# Patient Record
Sex: MALE | Race: WHITE | NOT HISPANIC OR LATINO | Employment: STUDENT | URBAN - NONMETROPOLITAN AREA
[De-identification: names, ages, dates, MRNs, and addresses within clinical notes are randomized per-mention and may not be internally consistent; named-entity substitution may affect disease eponyms.]

---
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
## 2023-06-05 ENCOUNTER — TRANSCRIBE ORDERS (OUTPATIENT)
Dept: DIABETES SERVICES | Facility: HOSPITAL | Age: 23
End: 2023-06-05
Payer: MEDICARE

## 2023-06-05 DIAGNOSIS — R73.03 PREDIABETES: Primary | ICD-10-CM

## 2023-06-05 DIAGNOSIS — E66.9 OBESITY, UNSPECIFIED CLASSIFICATION, UNSPECIFIED OBESITY TYPE, UNSPECIFIED WHETHER SERIOUS COMORBIDITY PRESENT: ICD-10-CM

## 2023-08-15 NOTE — PROGRESS NOTES
Chief Complaint  Diabetes (New patient, no devices )    Referred By: Jaguar Olvera MD    Subjective          Jeff Carlton presents to [facility] for diabetes medication management    History of Present Illness    Visit type:  to establish care  Diabetes type:  Prediabetes  Age at time of dx/Year of dx/Number of years: Patient had labs in May which showed an A1c of 6.2% indicating prediabetes at this point.  Family History of Diabetes: Paternal grandmother  Current diabetes status/concerns/issues: Patient was referred to our clinic by his primary care provider for management of his prediabetes.  Other current health concerns: mild mental disability  Current Diabetes symptoms:    Polyuria: No   Polydipsia: No   Polyphagia: No   Blurred vision: No   Excessive fatigue: Yes    Known Diabetes complications:  Neuropathy: None; Location: N/A  Renal: None  Eyes: None; Location: N/A; Last Eye Exam:  Earlier this year ; Location: Dr. Reyna  Amputation/Wounds: None  GI: None  Cardiovascular: Hyperlipidemia  ED: N/A  Other: None  Hospitalizations/ED/911 secondary to DM?  No  Hypoglycemia:  None reported at this time  Hypoglycemia Symptoms:  No hypoglycemia at this time  Current Diabetes treatment: None  Prior diabetes treatments: None  Using ACEI or ARB: No  Using Statin: No  Blood glucose device:  Meter  Blood glucose monitoring frequency:  Random/occasional  Blood glucose range/average:   Around 110  Glucose Source: Patient Reported  Dietary behavior:  Avoids sugary drinks, Number of meals each day - 2-3; Number of snacks each day - 1, admits to eating 2 juni cups a day  Activity/Exercise:  None  Last Foot Exam: None  Diabetes Education Hx: None  Social Determinants of Health: None    History reviewed. No pertinent past medical history.  History reviewed. No pertinent surgical history.  Family History   Problem Relation Age of Onset    Cancer Mother     Cancer Father     Diabetes  Maternal Grandmother     Hypertension Maternal Grandmother      Social History     Socioeconomic History    Marital status: Single   Tobacco Use    Smoking status: Never   Substance and Sexual Activity    Alcohol use: No     No Known Allergies    Current Outpatient Medications:     APPLE CIDER VINEGAR PO, Take  by mouth. One tab daily, Disp: , Rfl:     Biotin (Biotin 5000) 5 MG capsule, Take  by mouth., Disp: , Rfl:     buPROPion (WELLBUTRIN) 100 MG tablet, Take 1 tablet by mouth 2 (Two) Times a Day., Disp: , Rfl:     Melatonin 10 MG tablet, Take 1 tablet by mouth., Disp: , Rfl:     Objective     Vitals:    08/18/23 0924   BP: 135/74   BP Location: Right arm   Patient Position: Sitting   Cuff Size: Large Adult   Pulse: 79   SpO2: 97%   Weight: 114 kg (251 lb 3.2 oz)     There is no height or weight on file to calculate BMI.    Physical Exam        Result Review :   The following data was reviewed by: YAHAIRA Deutsch on 08/18/2023:    Most Recent A1C          8/18/2023    09:54   HGBA1C Most Recent   Hemoglobin A1C 6.4        A1C Last 3 Results          8/18/2023    09:54   HGBA1C Last 3 Results   Hemoglobin A1C 6.4      A1c collected 8/18/23 is 6.4%, indicating Controlled  prediabetes.           Assessment: Pt was referred to our clinic by his PCP for management of his prediabetes. His mom who is his guardian is with him at his appointment today. His A1c in May was 6.2%. Patient has a hx of mild mental delay. He is not currently on any medication for diabetes. His mom reports she is working on decreasing his carb and sugar intake. She states he does not get much exercise. He has never met with a dietician or had comprehensive diabetes education classes. Discussed referral for these services but mom defers at this time stating her mom was diabetic and she knows what foods to avoid with diabetes. Mom reports she spot checks his glucose and it is normally around 110mg/dl. His A1c in the office today is 6.4%  indicating prediabetes.       Diagnoses and all orders for this visit:    1. Prediabetes (Primary)  -     POC Glycosylated Hemoglobin (Hb A1C)    2. Mild mental handicap        Plan: Instructed mom and pt to work on low carb/low sugar diet and add exercise 30 min day 5 days week. No other changes were made at  today's visit. Scheduled a 3 month follow up and we will recheck an A1c at that time.     The patient will monitor his blood glucose levels one daily.  If he develops problematic hyperglycemia or hypoglycemia or adverse drug reactions, he will contact the office for further instructions.        Follow Up     Return in about 3 months (around 11/18/2023).    Patient was given instructions and counseling regarding his condition or for health maintenance advice. Please see specific information pulled into the AVS if appropriate.     Nena Thompson, YAHAIRA  08/18/2023      Dictated Utilizing Dragon Dictation.  Please note that portions of this note were completed with a voice recognition program.  Part of this note may be an electronic transcription/translation of spoken language to printed text using the Dragon Dictation System.

## 2023-08-18 ENCOUNTER — OFFICE VISIT (OUTPATIENT)
Dept: DIABETES SERVICES | Facility: CLINIC | Age: 23
End: 2023-08-18
Payer: MEDICARE

## 2023-08-18 VITALS
DIASTOLIC BLOOD PRESSURE: 74 MMHG | OXYGEN SATURATION: 97 % | HEART RATE: 79 BPM | WEIGHT: 251.2 LBS | SYSTOLIC BLOOD PRESSURE: 135 MMHG

## 2023-08-18 DIAGNOSIS — R73.03 PREDIABETES: Primary | ICD-10-CM

## 2023-08-18 DIAGNOSIS — F70 MILD MENTAL HANDICAP: ICD-10-CM

## 2023-08-18 PROBLEM — D84.89: Status: ACTIVE | Noted: 2023-08-18

## 2023-08-18 LAB
EXPIRATION DATE: ABNORMAL
HBA1C MFR BLD: 6.4 %
Lab: ABNORMAL

## 2023-08-18 PROCEDURE — 3044F HG A1C LEVEL LT 7.0%: CPT | Performed by: NURSE PRACTITIONER

## 2023-08-18 PROCEDURE — 83036 HEMOGLOBIN GLYCOSYLATED A1C: CPT | Performed by: NURSE PRACTITIONER

## 2023-08-18 PROCEDURE — 1159F MED LIST DOCD IN RCRD: CPT | Performed by: NURSE PRACTITIONER

## 2023-08-18 PROCEDURE — 1160F RVW MEDS BY RX/DR IN RCRD: CPT | Performed by: NURSE PRACTITIONER

## 2023-08-18 PROCEDURE — 99204 OFFICE O/P NEW MOD 45 MIN: CPT | Performed by: NURSE PRACTITIONER

## 2023-08-18 RX ORDER — BUPROPION HYDROCHLORIDE 100 MG/1
100 TABLET ORAL 2 TIMES DAILY
COMMUNITY
Start: 2023-08-17

## 2023-08-18 RX ORDER — DIPHENHYDRAMINE HYDROCHLORIDE 25 MG/1
TABLET ORAL
COMMUNITY

## 2023-08-18 RX ORDER — PHENOL 1.4 %
10 AEROSOL, SPRAY (ML) MUCOUS MEMBRANE
COMMUNITY

## 2023-08-22 PROBLEM — D84.89: Status: RESOLVED | Noted: 2023-08-18 | Resolved: 2023-08-22

## 2023-08-22 PROBLEM — F70 MILD MENTAL HANDICAP: Status: ACTIVE | Noted: 2023-08-22

## 2023-09-01 ENCOUNTER — PATIENT ROUNDING (BHMG ONLY) (OUTPATIENT)
Dept: DIABETES SERVICES | Facility: HOSPITAL | Age: 23
End: 2023-09-01
Payer: MEDICARE

## 2023-09-01 NOTE — PROGRESS NOTES
September 1, 2023    Hello, may I speak with Jeff Carlton?    My name is Sandi Harrison      I am  with [facility]  [address] [place] [address] [facility] [address] [phone].    Before we get started may I verify your date of birth? 2000    I am calling to officially welcome you to our practice and ask about your recent visit. Is this a good time to talk? no    Left message per script.  Thank you, and have a great day.

## 2023-12-12 ENCOUNTER — OFFICE VISIT (OUTPATIENT)
Dept: CARDIOLOGY | Facility: CLINIC | Age: 23
End: 2023-12-12
Payer: MEDICARE

## 2023-12-12 VITALS
WEIGHT: 250 LBS | HEIGHT: 64 IN | BODY MASS INDEX: 42.68 KG/M2 | HEART RATE: 90 BPM | SYSTOLIC BLOOD PRESSURE: 131 MMHG | DIASTOLIC BLOOD PRESSURE: 57 MMHG

## 2023-12-12 DIAGNOSIS — E78.2 MIXED HYPERLIPIDEMIA: Primary | ICD-10-CM

## 2023-12-12 PROCEDURE — 99203 OFFICE O/P NEW LOW 30 MIN: CPT | Performed by: INTERNAL MEDICINE

## 2023-12-12 NOTE — PROGRESS NOTES
"Chief Complaint  Establish Care and Hyperlipidemia (Could not take statins)    Subjective        Jeff Carlton presents to [facility]  History of present illness:    Patient is a 23-year-old male with past medical history significant for hyperlipidemia and obesity.  Also has a history of prediabetes.  They are working with the dietitian and trying for weight loss.  He notes no exertional chest pain.  He denies any palpitations or edema.  He was on Zocor in the past but had labs 5/30/2023 that showed an ALT of 110 and AST of 49.  He was called that day and told to stop the Zocor.  He notes no tobacco or alcohol history.  Mother and father have no heart disease.      Past Medical History:   Diagnosis Date    Hyperlipidemia          History reviewed. No pertinent surgical history.       Social History     Socioeconomic History    Marital status: Single   Tobacco Use    Smoking status: Never    Smokeless tobacco: Never   Vaping Use    Vaping Use: Never used   Substance and Sexual Activity    Alcohol use: No    Drug use: Never    Sexual activity: Defer         Family History   Problem Relation Age of Onset    Cancer Mother     Cancer Father     Diabetes Maternal Grandmother     Hypertension Maternal Grandmother           No Known Allergies         Current Outpatient Medications:     APPLE CIDER VINEGAR PO, Take  by mouth. One tab daily, Disp: , Rfl:     Biotin (Biotin 5000) 5 MG capsule, Take  by mouth., Disp: , Rfl:     buPROPion (WELLBUTRIN) 100 MG tablet, Take 1 tablet by mouth 2 (Two) Times a Day., Disp: , Rfl:     Melatonin 10 MG tablet, Take 1 tablet by mouth., Disp: , Rfl:       ROS:  Review of systems negative.    Objective     /57   Pulse 90   Ht 162.6 cm (64\")   Wt 113 kg (250 lb)   BMI 42.91 kg/m²       General Appearance:   well developed  well nourished  HENT:   oropharynx moist  lips not cyanotic  Respiratory:  no respiratory distress  normal breath sounds  no " "rales  Cardiovascular:  no jugular venous distention  regular rhythm  S1 normal, S2 normal  no S3, no S4   no murmur  no rub, no thrill  No carotid bruit  pedal pulses normal  lower extremity edema: none    Musculoskeletal:  no clubbing of fingers.   normocephalic, head atraumatic  Skin:   warm, dry  Psychiatric:  judgement and insight appropriate  normal mood and affect    ECHO:    STRESS:    CATH:  No results found for this or any previous visit.    BMP:   No results found for: \"GLUCOSE\", \"BUN\", \"CREATININE\", \"NA\", \"K\", \"CL\", \"CO2\", \"CALCIUM\", \"BCR\", \"ANIONGAP\", \"EGFR\"  LIPIDS:  No results found for: \"CHOL\", \"TRIG\", \"HDL\", \"LDL\", \"VLDL\", \"LDLHDL\"      Procedures             ASSESSMENT:  Diagnoses and all orders for this visit:    1. Mixed hyperlipidemia (Primary)         PLAN:    1.  Encouraged weight loss.  2.  Told patient I would recommend rechecking his LFTs to make sure they have come back to normal and rechecking his cholesterol off of all medications and see if he truly needs a cholesterol pill or if he can just continue diet and exercise.  They want this done through his primary care physician which I think is fine.  3.  Blood pressures under good control.  4.  We will just see back as needed.      Return if symptoms worsen or fail to improve.     Patient was given instructions and counseling regarding his condition or for health maintenance advice. Please see specific information pulled into the AVS if appropriate.         Eugenio Agrawal MD   12/12/2023  14:03 EST  "